# Patient Record
Sex: FEMALE | Race: WHITE | NOT HISPANIC OR LATINO | Employment: OTHER | URBAN - METROPOLITAN AREA
[De-identification: names, ages, dates, MRNs, and addresses within clinical notes are randomized per-mention and may not be internally consistent; named-entity substitution may affect disease eponyms.]

---

## 2020-01-29 ENCOUNTER — ESTABLISHED COMPREHENSIVE EXAM (OUTPATIENT)
Dept: URBAN - METROPOLITAN AREA CLINIC 35 | Facility: CLINIC | Age: 45
End: 2020-01-29

## 2020-01-29 DIAGNOSIS — H04.123: ICD-10-CM

## 2020-01-29 PROCEDURE — 92015 DETERMINE REFRACTIVE STATE: CPT

## 2020-01-29 PROCEDURE — 92014 COMPRE OPH EXAM EST PT 1/>: CPT

## 2020-01-29 ASSESSMENT — VISUAL ACUITY
OD_SC: J1
OS_SC: 20/25-2
OS_SC: J2
OD_SC: 20/30-2

## 2020-01-29 ASSESSMENT — TONOMETRY
OS_IOP_MMHG: 13
OD_IOP_MMHG: 14

## 2021-02-01 ENCOUNTER — ESTABLISHED COMPREHENSIVE EXAM (OUTPATIENT)
Dept: URBAN - METROPOLITAN AREA CLINIC 35 | Facility: CLINIC | Age: 46
End: 2021-02-01

## 2021-02-01 DIAGNOSIS — H04.123: ICD-10-CM

## 2021-02-01 DIAGNOSIS — H52.11: ICD-10-CM

## 2021-02-01 PROCEDURE — 92014 COMPRE OPH EXAM EST PT 1/>: CPT

## 2021-02-01 PROCEDURE — 92015 DETERMINE REFRACTIVE STATE: CPT

## 2021-02-01 ASSESSMENT — TONOMETRY
OD_IOP_MMHG: 16
OS_IOP_MMHG: 16

## 2021-02-01 ASSESSMENT — VISUAL ACUITY
OS_SC: J1
OD_SC: 20/40-1
OS_SC: 20/30-1
OD_SC: J2

## 2022-06-08 NOTE — PATIENT DISCUSSION
Discussed with Pt to call back in a week to inform us if the CL feel comfortable and if so will order CL through optical.

## 2023-01-25 ENCOUNTER — COMPREHENSIVE EXAM (OUTPATIENT)
Dept: URBAN - METROPOLITAN AREA CLINIC 35 | Facility: CLINIC | Age: 48
End: 2023-01-25

## 2023-01-25 DIAGNOSIS — H04.123: ICD-10-CM

## 2023-01-25 DIAGNOSIS — H52.11: ICD-10-CM

## 2023-01-25 PROCEDURE — 92015 DETERMINE REFRACTIVE STATE: CPT

## 2023-01-25 PROCEDURE — 92014 COMPRE OPH EXAM EST PT 1/>: CPT

## 2023-01-25 ASSESSMENT — TONOMETRY
OD_IOP_MMHG: 15
OS_IOP_MMHG: 15

## 2023-01-25 ASSESSMENT — VISUAL ACUITY
OD_CC: 20/20-2
OS_CC: 20/20-2
OU_SC: J1
OU_SC: 20/20
OS_SC: J2-
OS_SC: 20/25+2
OD_SC: 20/30
OD_SC: J1-
OU_CC: 20/20

## 2023-01-25 ASSESSMENT — KERATOMETRY
OD_K2POWER_DIOPTERS: 47.00
OS_K1POWER_DIOPTERS: 46.50
OD_AXISANGLE_DEGREES: 55
OS_K2POWER_DIOPTERS: 46.75
OS_AXISANGLE2_DEGREES: 55
OS_AXISANGLE_DEGREES: 145
OD_AXISANGLE2_DEGREES: 145
OD_K1POWER_DIOPTERS: 46.75

## 2025-01-27 ENCOUNTER — COMPREHENSIVE EXAM (OUTPATIENT)
Age: 50
End: 2025-01-27

## 2025-01-27 DIAGNOSIS — H52.4: ICD-10-CM

## 2025-01-27 DIAGNOSIS — H04.123: ICD-10-CM

## 2025-01-27 DIAGNOSIS — H52.11: ICD-10-CM

## 2025-01-27 PROCEDURE — 92015 DETERMINE REFRACTIVE STATE: CPT

## 2025-01-27 PROCEDURE — 99213 OFFICE O/P EST LOW 20 MIN: CPT
